# Patient Record
Sex: FEMALE | URBAN - METROPOLITAN AREA
[De-identification: names, ages, dates, MRNs, and addresses within clinical notes are randomized per-mention and may not be internally consistent; named-entity substitution may affect disease eponyms.]

---

## 2023-10-03 ENCOUNTER — APPOINTMENT (RX ONLY)
Dept: URBAN - METROPOLITAN AREA CLINIC 282 | Facility: CLINIC | Age: 48
Setting detail: DERMATOLOGY
End: 2023-10-03

## 2023-10-03 DIAGNOSIS — L81.4 OTHER MELANIN HYPERPIGMENTATION: ICD-10-CM

## 2023-10-03 PROCEDURE — ? PATIENT SPECIFIC COUNSELING

## 2023-10-03 PROCEDURE — 99202 OFFICE O/P NEW SF 15 MIN: CPT

## 2023-10-03 PROCEDURE — ? COUNSELING

## 2023-10-03 PROCEDURE — ? PRESCRIPTION

## 2023-10-03 RX ORDER — HYDROQUINONE 40 MG/G
CREAM TOPICAL
Qty: 28.35 | Refills: 2 | Status: ERX | COMMUNITY
Start: 2023-10-03

## 2023-10-03 RX ADMIN — HYDROQUINONE: 40 CREAM TOPICAL at 00:00

## 2023-10-03 ASSESSMENT — LOCATION SIMPLE DESCRIPTION DERM: LOCATION SIMPLE: LEFT CHEEK

## 2023-10-03 ASSESSMENT — LOCATION ZONE DERM: LOCATION ZONE: FACE

## 2023-10-03 ASSESSMENT — LOCATION DETAILED DESCRIPTION DERM: LOCATION DETAILED: LEFT MEDIAL MALAR CHEEK

## 2023-10-03 NOTE — HPI: SKIN LESIONS
[facility] Nonnie Sale, they wanted to call us and let us know she does not go there  Please send to correct pharmacy, thank you! Is This A New Presentation, Or A Follow-Up?: Skin Lesion

## 2023-10-03 NOTE — PROCEDURE: PATIENT SPECIFIC COUNSELING
Detail Level: Detailed
Sun avoidance recommended.  Return sooner if lesion changes coloration or becomes bothersome.